# Patient Record
Sex: FEMALE | Race: ASIAN | NOT HISPANIC OR LATINO | URBAN - METROPOLITAN AREA
[De-identification: names, ages, dates, MRNs, and addresses within clinical notes are randomized per-mention and may not be internally consistent; named-entity substitution may affect disease eponyms.]

---

## 2017-03-08 ENCOUNTER — OFFICE VISIT - HEALTHEAST (OUTPATIENT)
Dept: FAMILY MEDICINE | Facility: CLINIC | Age: 47
End: 2017-03-08

## 2017-03-08 DIAGNOSIS — Z28.39 IMMUNIZATION DEFICIENCY: ICD-10-CM

## 2017-03-08 ASSESSMENT — MIFFLIN-ST. JEOR: SCORE: 1083.34

## 2017-11-17 ENCOUNTER — OFFICE VISIT - HEALTHEAST (OUTPATIENT)
Dept: FAMILY MEDICINE | Facility: CLINIC | Age: 47
End: 2017-11-17

## 2017-11-17 DIAGNOSIS — Z23 NEED FOR IMMUNIZATION AGAINST INFLUENZA: ICD-10-CM

## 2017-11-17 DIAGNOSIS — M65.4 TENDINITIS, DE QUERVAIN'S: ICD-10-CM

## 2021-05-30 VITALS — HEIGHT: 61 IN | WEIGHT: 115.5 LBS | BODY MASS INDEX: 21.81 KG/M2

## 2021-05-31 VITALS — BODY MASS INDEX: 22.28 KG/M2 | WEIGHT: 116 LBS

## 2021-06-09 NOTE — PROGRESS NOTES
"S:  Patient seen in clinic today for green card exam and update of immunizations.  There is no past history of immunization reactions.  No recent fevers or shortness of breath.     Patient Active Problem List   Diagnosis     Nonspecific reaction to tuberculin test     Vitamin D insufficiency     Abnormal Pap smear of cervix       O:    Visit Vitals     /74 (Patient Site: Right Arm, Patient Position: Sitting, Cuff Size: Adult Regular)     Pulse 80     Temp 98  F (36.7  C) (Oral)     Resp 20     Ht 5' 0.5\" (1.537 m)     Wt 115 lb 8 oz (52.4 kg)     LMP 03/06/2017     Breastfeeding No     BMI 22.19 kg/m2     General - alert, NAD  CV - RRR  Resp - lunds clear to auscultation    A/P:  Green Card/update imm.  Counseling done on immunization history and requirements with the patient.  Reviewed available immunization history and relevant lab records with patient and family.  Immunizations given as documented in the EHR and on form.  Acetaminophen as needed for post-immunization fever or myalgias.  [place] [employer] form [id number] completed today with the patient.  Given to patient in a sealed labeled envelope and a copy is being scanned into the EHR.  Please see that form for further details.  Patient directed to follow-up in clinic for routine and preventative care.     "

## 2021-06-14 NOTE — PROGRESS NOTES
Subjective:      Marvel Aquino is a 46 y.o. female who presents for evaluation of right thumb pain.  She has had pain at the base of her right thumb for about 2 months.  It can get painful and achy.  She is right-hand dominant.  She does not recall any specific injuries to the hand or finger.  She has not tried any medicines yet.  She notes that movement or carrying something heavy makes the pain worse.  Rest makes the pain better.  She does not drive.  She is not recall pain triggered by trying to open a jar.  Pain is staying about the same over time.  She works in the laundry.      Patient Active Problem List   Diagnosis     Nonspecific reaction to tuberculin test     Vitamin D insufficiency     Abnormal Pap smear of cervix       Current Outpatient Prescriptions:      ibuprofen (ADVIL,MOTRIN) 400 MG tablet, Take 1 tablet (400 mg total) by mouth every 6 (six) hours as needed for pain. Take with food., Disp: 60 tablet, Rfl: 0     isoniazid (NYDRAZID) 300 MG tablet, Take 300 mg by mouth daily., Disp: , Rfl:      THIAMINE HCL (VITAMIN B-1 ORAL), Take by mouth., Disp: , Rfl:      Objective:     No Known Allergies  Vitals:    11/17/17 0834   BP: 92/60   Pulse: 60   Resp: 16     Body mass index is 22.28 kg/(m^2).    Vitals reviewed as above.  General: Patient is alert and oriented x 3, in no apparent distress  Cardiac: Regular rate and rhythm, no murmurs  Pulmonary: lungs clear to ausculation, no crackles, rales, rhonchi, or wheezing  Musculoskeletal: Normal  strength in the right hand, pain present along the base of the right thumb to the right wrist, mild pain with palpation in this area, full active range of motion of all of the fingers and the wrist, no edema or erythema, positive Finkelstein's test    Assessment and Plan:     Right thumb de Quervain's tendinitis.  Start with conservative management with ibuprofen, ice, thumb spica splint.  She will try this for 2 weeks, then follow-up if no significant  improvement.  She works in a laundry.  I am not sure if she would be able to wear the splint while doing her job.  If she cannot, she could try just wearing it when she is not at work, and see if that helps.    This dictation uses voice recognition software, which may contain typographical errors.

## 2021-06-16 PROBLEM — M65.4 TENDINITIS, DE QUERVAIN'S: Status: ACTIVE | Noted: 2017-11-17
